# Patient Record
Sex: MALE | Race: WHITE | NOT HISPANIC OR LATINO | Employment: UNEMPLOYED | URBAN - METROPOLITAN AREA
[De-identification: names, ages, dates, MRNs, and addresses within clinical notes are randomized per-mention and may not be internally consistent; named-entity substitution may affect disease eponyms.]

---

## 2018-01-29 ENCOUNTER — OFFICE VISIT (OUTPATIENT)
Dept: MEDICAL GROUP | Facility: LAB | Age: 28
End: 2018-01-29
Payer: COMMERCIAL

## 2018-01-29 VITALS
OXYGEN SATURATION: 85 % | RESPIRATION RATE: 18 BRPM | WEIGHT: 151.3 LBS | DIASTOLIC BLOOD PRESSURE: 78 MMHG | SYSTOLIC BLOOD PRESSURE: 110 MMHG | TEMPERATURE: 98.6 F | HEIGHT: 70 IN | BODY MASS INDEX: 21.66 KG/M2 | HEART RATE: 94 BPM

## 2018-01-29 DIAGNOSIS — Z23 NEED FOR VACCINATION: ICD-10-CM

## 2018-01-29 DIAGNOSIS — Z00.00 ANNUAL PHYSICAL EXAM: ICD-10-CM

## 2018-01-29 DIAGNOSIS — B37.0 ORAL THRUSH: ICD-10-CM

## 2018-01-29 PROCEDURE — 99204 OFFICE O/P NEW MOD 45 MIN: CPT | Mod: 25 | Performed by: INTERNAL MEDICINE

## 2018-01-29 PROCEDURE — 90746 HEPB VACCINE 3 DOSE ADULT IM: CPT | Performed by: INTERNAL MEDICINE

## 2018-01-29 PROCEDURE — 90471 IMMUNIZATION ADMIN: CPT | Performed by: INTERNAL MEDICINE

## 2018-01-29 ASSESSMENT — PATIENT HEALTH QUESTIONNAIRE - PHQ9: CLINICAL INTERPRETATION OF PHQ2 SCORE: 0

## 2018-01-30 NOTE — ASSESSMENT & PLAN NOTE
This is a pleasant 27 years old gentleman who is here today to establish care with a new primary care provider. He is fairly healthy, he will be and exercises on a regular basis. He is up-to-date on most of his immunizations. He told me that he just finished his graduate degree and he is currently working as a  in remote areas around [place]. He is planning to move to [place] soon for work and adventure.

## 2018-01-30 NOTE — ASSESSMENT & PLAN NOTE
This is a new problem to me. This patient reported more than 10 years history of oral thrush. He explained that he has a whitecoat covering his tongue area consistently long as he can remember since he was a kid. It is easily scraped off his tongue. He is not a smoker. Pretty aggressive about his mouth hygiene as far as brushing, flossing and uses mouthwash. He also tried the aggressive tongue scrapings in the past without help.  Wants to be checked out for this problem today.  I asked him about different risk factors for immune deficiency problems. He reported no history of recurrent infections in his time life. He is not sexually active. Has not tried injection drug use ever. His risk factor for HIV is very low. He has no family history of similar condition. He is not known to be diabetic.

## 2018-01-30 NOTE — PROGRESS NOTES
Chief Complaint   Patient presents with   • Annual Exam  Oral Thrush       Subjective:     History of Present Illness: Patient is a 27 y.o. male. This pleasant patient is here today to establish care with a new primary care provider and address medical problems listed below.    Annual physical exam  This is a pleasant 27 years old gentleman who is here today to establish care with a new primary care provider. He is fairly healthy, he will be and exercises on a regular basis. He is up-to-date on most of his immunizations. He told me that he just finished his graduate degree and he is currently working as a  in remote areas around [place]. He is planning to move to [place] soon for work and adventure.    Oral thrush  This is a new problem to me. This patient reported more than 10 years history of oral thrush. He explained that he has a whitecoat covering his tongue area consistently long as he can remember since he was a kid. It is easily scraped off his tongue. He is not a smoker. Pretty aggressive about his mouth hygiene as far as brushing, flossing and uses mouthwash. He also tried the aggressive tongue scrapings in the past without help.  Wants to be checked out for this problem today.  I asked him about different risk factors for immune deficiency problems. He reported no history of recurrent infections in his time life. He is not sexually active. Has not tried injection drug use ever. His risk factor for HIV is very low. He has no family history of similar condition. He is not known to be diabetic.      Allergies: Patient has no allergy information on record.    Current Outpatient Prescriptions [facility]   Medication Sig Dispense Refill   • nystatin (MYCOSTATIN) 194235 UNIT/ML Suspension Take 5 mL by mouth 4 times a day for 14 days. Swish and spit 4 times daily. 280 mL 0     No current Epic-ordered facility-administered medications on file.        Past Medical History:   Diagnosis Date   •  Oral thrush 1/29/2018    > 10 yrs. ? Oral candida.  No risk factors for HIV.  No recurrent infections.       Past Surgical History:   Procedure Laterality Date   • ACHILLES TENDON REPAIR Right     As a child   • DENTAL EXTRACTION(S)     • TONSILLECTOMY         Social History   Substance Use Topics   • Smoking status: Never Smoker   • Smokeless tobacco: Never Used   • Alcohol use 7.2 oz/week     6 Glasses of wine, 6 Shots of liquor per week      Comment: 6-7/ wk       Family History   Problem Relation Age of Onset   • No Known Problems Mother    • No Known Problems Father    • No Known Problems Brother        ROS:     - Constitutional: Negative for fever, chills, unexpected weight change, and fatigue/generalized weakness.     - HEENT: Negative for headaches, vision changes, hearing changes, ear pain, ear discharge, sinus congestion, sore throat, and neck pain.      - Respiratory: Negative for cough, sputum production, dyspnea and wheezing.    - Cardiovascular: Negative for chest pain, palpitations, orthopnea, and bilateral lower extremity edema.     - Gastrointestinal: Negative for heartburn, nausea, vomiting, abdominal pain, hematochezia, melena, diarrhea, constipation, and greasy/foul-smelling stools.     - Genitourinary: Negative for dysuria, polyuria, hematuria, pyuria, urinary urgency, and urinary incontinence.    - Musculoskeletal: Negative for myalgias, back pain, and joint pain.     - Skin: Negative for rash, itching, cyanotic skin color change.     - Neurological: Negative for dizziness, tingling, tremors, focal sensory deficit, focal weakness and headaches.     - Endo/Heme/Allergies: Does not bruise/bleed easily.     - Psychiatric/Behavioral: Negative for depression, suicidal/homicidal ideation and memory loss.        - NOTE: All other systems reviewed and are negative, except as in HPI.       Physical Exam:     Blood pressure 110/78, pulse 94, temperature 37 °C (98.6 °F), resp. rate 18, height 1.778 m (5'  "10\"), weight 68.6 kg (151 lb 4.8 oz), SpO2 (!) 85 %. Body mass index is 21.71 kg/m².   General: Normal appearing. No distress.  HEENT: Normocephalic. Eyes conjunctiva clear lids without ptosis, pupils equal and reactive to light accommodation, ears normal shape and contour, canals are clear bilaterally, tympanic membranes are benign,  oropharynx is without erythema, edema or exudates.  Noted white tongue thrush covering all his area but not extending to buccal mucosa I could not see if he has a pharyngeal involvement. Neck: Supple without JVD or bruit. Thyroid is not enlarged.  Pulmonary: Clear to ausculation.  Normal effort. No rales, ronchi, or wheezing.  Cardiovascular: Regular rate and rhythm without murmur. Radial pulses are intact, regular and symmetrical bilaterally.  Abdomen: Soft, nontender, nondistended. Normal bowel sounds. Liver and spleen are not palpable.  Neurologic: Grossly non-focal.  Lymph: No cervical, occipital or supraclavicular lymph nodes are palpable  Skin: Warm and dry.  No obvious lesions.  Musculoskeletal: Normal gait. No extremity cyanosis, clubbing, or edema.  Psych: Normal mood and affect. Alert and oriented x3. Judgment and insight is normal.    Assessment and Plan:     1. Annual physical exam  Ascus in history of present illness, he is fairly healthy apart from his oral thrush problem. We will proceed with the following labs for screening.  - HEMOGLOBIN A1C; Future  - COMP METABOLIC PANEL; Future  - CBC WITH DIFFERENTIAL; Future  - TSH; Future  - VITAMIN D,25 HYDROXY; Future  - LIPID PROFILE; Future  - MAGNESIUM; Future  - HIV AG/AB COMBO ASSAY SCREENING; Future    2. Oral thrush  New uncontrolled problem. Patient has a white oral thrush for over 10 years, not a smoker and has no risk factors for immune deficiency such as diabetes, HIV or recent antibiotic use. His physical exam was more consistent often oral candidiasis. I obtained an oral scraping that will be sent out for candida " testing. I will give him a trial of nystatin swish and spit 4 times daily for 2 weeks.  I would like to investigate further to see if he has an underlying immune deficiency problems, we'll proceed with diabetes screening and HIV screening despite low risk factors.  If these factors excluded we will see if he'll respond to a trial off simvastatin.  One thing to consider is chronic mucocutaneous candidiasis as part of autoimmune regulator deficiency syndrome (AIRE deficiency) changes were but it's associated with chronic mucocutaneous candidiasis and endocrinopathies most commonly hypothyroidism and adrenal insufficiency. I will also be screening him for hypothyroidism as well.  - nystatin (MYCOSTATIN) 900521 UNIT/ML Suspension; Take 5 mL by mouth 4 times a day for 14 days. Swish and spit 4 times daily.  Dispense: 280 mL; Refill: 0  - HIV AG/AB COMBO ASSAY SCREENING; Future  - VAGINAL PATHOGENS DNA PANEL; Future    3. Need for vaccination  First vaccine of hepatitis B series given today in the office.  - HEPATITIS B VACCINE ADULT IM      Health Maintenance:      Completed  There are no preventive care reminders to display for this patient.    Follow Up:      Return in about 4 weeks (around 2/26/2018) for FU Thrush.    Please note that this dictation was created using voice recognition software. I have made every reasonable attempt to correct obvious errors, but I expect that there are errors of grammar and possibly content that I did not discover before finalizing the note.    Signed by: Marisela Armstrong M.D.

## 2018-01-31 ENCOUNTER — TELEPHONE (OUTPATIENT)
Dept: MEDICAL GROUP | Facility: LAB | Age: 28
End: 2018-01-31

## 2018-01-31 NOTE — TELEPHONE ENCOUNTER
VOICEMAIL  1. Caller Name: Brittany @ [email or web address]                    Call Back Number: [phone]     2. Message: Brittany from [email or web address] called with ref# [id number] there is a problem with the specimen. The wrong swab and order was used.    3. Patient approves office to leave a detailed voicemail/MyChart message: yes

## 2018-01-31 NOTE — TELEPHONE ENCOUNTER
Phone Number Called: [phone]    Message: I spoke to , she stated the swab we used can only be used for female for testing for bacteria, vaginitis, STD, chlamydia and gonorrhea. She stated we should have used the  swab aerobic culture with code 4550. It takes about 5 days to process the culture.    Left Message for patient to call back: N\A

## 2018-02-02 NOTE — TELEPHONE ENCOUNTER
Phone Number Called: [phone] (home)     Message: I spoke to Caesar regarding his tongue culture, that the culture wasn't able to get process. We can repeat the culture at his next visit if needed. Patient understood.    Left Message for patient to call back: N\A

## 2018-02-20 ENCOUNTER — PATIENT MESSAGE (OUTPATIENT)
Dept: MEDICAL GROUP | Facility: LAB | Age: 28
End: 2018-02-20

## 2018-02-20 ENCOUNTER — OFFICE VISIT (OUTPATIENT)
Dept: MEDICAL GROUP | Facility: LAB | Age: 28
End: 2018-02-20
Payer: COMMERCIAL

## 2018-02-20 VITALS
BODY MASS INDEX: 21.62 KG/M2 | OXYGEN SATURATION: 97 % | DIASTOLIC BLOOD PRESSURE: 76 MMHG | SYSTOLIC BLOOD PRESSURE: 108 MMHG | HEIGHT: 70 IN | HEART RATE: 94 BPM | WEIGHT: 151 LBS | TEMPERATURE: 98.3 F | RESPIRATION RATE: 18 BRPM

## 2018-02-20 DIAGNOSIS — B37.0 ORAL THRUSH: ICD-10-CM

## 2018-02-20 PROCEDURE — 99213 OFFICE O/P EST LOW 20 MIN: CPT | Performed by: INTERNAL MEDICINE

## 2018-02-20 NOTE — ASSESSMENT & PLAN NOTE
This is a chronic uncontrolled problem.   This patient has a long-standing history of oral thrush that is suspicious for muco-cutaneous candidiasis. No identified risk factors for HIV, nonsmoker, aggressive with his oral hygiene. No recent antibiotic use. Had blood work done last month at [facility] lab but results are still pending.  Had a trial of nystatin swish and spit for one month without improvement.

## 2018-02-21 NOTE — PROGRESS NOTES
Chief Complaint   Patient presents with   • Thrush     Follow up       Subjective:     HPI:   Caesar presents today with the following.    Oral thrush  This is a chronic uncontrolled problem.   This patient has a long-standing history of oral thrush that is suspicious for muco-cutaneous candidiasis. No identified risk factors for HIV, nonsmoker, aggressive with his oral hygiene. No recent antibiotic use. Had blood work done last month at [facility] lab but results are still pending.  Had a trial of nystatin swish and spit for one month without improvement.          Patient Active Problem List    Diagnosis Date Noted   • Annual physical exam 01/29/2018   • Oral thrush 01/29/2018       No current outpatient prescriptions on file.     No current facility-administered medications for this visit.        Allergies as of 02/20/2018   • (Not on File)        Past Medical History:   Diagnosis Date   • Oral thrush 1/29/2018    > 10 yrs. ? Oral candida.  No risk factors for HIV.  No recurrent infections.       Past Surgical History:   Procedure Laterality Date   • ACHILLES TENDON REPAIR Right     As a child   • DENTAL EXTRACTION(S)     • TONSILLECTOMY         Social History   Substance Use Topics   • Smoking status: Never Smoker   • Smokeless tobacco: Never Used   • Alcohol use 7.2 oz/week     6 Glasses of wine, 6 Shots of liquor per week      Comment: 6-7/ wk       Family History   Problem Relation Age of Onset   • No Known Problems Mother    • No Known Problems Father    • No Known Problems Brother        ROS:     - Constitutional: Negative for fever, chills, unexpected weight change, and fatigue/generalized weakness.     - HEENT: Negative for headaches, vision changes, hearing changes, ear pain, ear discharge, sinus congestion, or sore throat.     - Respiratory: Negative for cough, sputum production, dyspnea and wheezing.    - Cardiovascular: Negative for chest pain or palpitations.      - Gastrointestinal: Negative for  "heartburn, nausea, vomiting, abdominal pain, diarrhea or constipation.     - Genitourinary: Negative for dysuria, polyuria or urinary urgency.    - Musculoskeletal: Negative for myalgias, back pain, and joint pain.     - Skin: Negative for rash, itching, cyanotic skin color change.     - Psychiatric/Behavioral: Negative for depression or suicidal/homicidal ideation.       Physical Exam:     Blood pressure 108/76, pulse 94, temperature 36.8 °C (98.3 °F), resp. rate 18, height 1.778 m (5' 10\"), weight 68.5 kg (151 lb), SpO2 97 %. Body mass index is 21.67 kg/m².   Gen:         Alert and oriented, No apparent distress.  HEENT:    Wide oral thrush identified, Unable to scrape.  Neck:        No Lymphadenopathy or Bruits.  Lungs:     Clear to auscultation bilaterally  CV:          Regular rate and rhythm. No murmurs, rubs or gallops.               Ext:          No clubbing, cyanosis, edema.    Data:     Labs were ordered and then at [facility] lab. Requested lab results.    Assessment and Plan:     27 y.o. male with the following issues.    1. Oral thrush  Chronic uncontrolled.  Am not sure at this point if this is a fungal (mucocutaneous candidiasis) versus other type of thrush. In general, this patient is not a smoker and he is aggressive with his oral hygiene. He has no improvement on any statin trial. We send a sample for fungal smear today. I will obtain result of his screening labs from last month from [facility] and will call him about the result. We'll proceed with infectious disease consultation for further evaluation.    - FUNGAL SMEAR  - REFERRAL TO INFECTIOUS DISEASE           Health Maintenance:   Completed.    There are no preventive care reminders to display for this patient.    Follow Up:      No Follow-up on file.      Please note that this dictation was created using voice recognition software. I have made every reasonable attempt to correct obvious errors, but I expect that there are errors of grammar and " possibly content that I did not discover before finalizing the note.    Signed by: Marisela Armstrong M.D.

## 2018-02-23 ENCOUNTER — TELEPHONE (OUTPATIENT)
Dept: INTERNAL MEDICINE | Facility: MEDICAL CENTER | Age: 28
End: 2018-02-23

## 2018-02-23 NOTE — TELEPHONE ENCOUNTER
INCOMING REFERRAL:   DX: Oral Thrush   Please review chart and let me know if I'm able to schedule an appt.  Thanks      Referral scanned and routed to you.

## 2018-02-26 ENCOUNTER — TELEPHONE (OUTPATIENT)
Dept: MEDICAL GROUP | Facility: LAB | Age: 28
End: 2018-02-26

## 2018-02-26 ENCOUNTER — NON-PROVIDER VISIT (OUTPATIENT)
Dept: MEDICAL GROUP | Facility: LAB | Age: 28
End: 2018-02-26
Payer: COMMERCIAL

## 2018-02-26 DIAGNOSIS — Z23 NEED FOR HEPATITIS B VACCINATION: ICD-10-CM

## 2018-02-26 PROCEDURE — 90746 HEPB VACCINE 3 DOSE ADULT IM: CPT | Performed by: NURSE PRACTITIONER

## 2018-02-26 PROCEDURE — 90471 IMMUNIZATION ADMIN: CPT | Performed by: NURSE PRACTITIONER

## 2018-02-26 NOTE — TELEPHONE ENCOUNTER
1. Caller Name:                Caesar Salter                       Patient approves a detailed voicemail message: yes    Patient is on the MA Schedule today for 2nd Hep vaccine/injection.    SPECIFIC Action To Be Taken: Orders pending, please sign.

## 2018-02-27 ENCOUNTER — PATIENT MESSAGE (OUTPATIENT)
Dept: MEDICAL GROUP | Facility: LAB | Age: 28
End: 2018-02-27

## 2018-02-27 NOTE — TELEPHONE ENCOUNTER
Phone Number Called: [phone]    Message: I spoke to [facility], they will be faxing over the Fungal Smear Results to fax # [phone] today.    Left Message for patient to call back: N\A

## 2018-02-27 NOTE — TELEPHONE ENCOUNTER
I have placed the below orders and discussed them with Dr. Woodruff. the MA is performing the below orders under the direction of Dr. GRAYSON

## 2018-02-28 NOTE — PROGRESS NOTES
"Caesar Salter is a 27 y.o. male here for a non-provider visit for:   HEPATITIS B 2 of 3    Reason for immunization: needed for work/school  Immunization records indicate need for vaccine: Yes, confirmed with [facility]  Minimum interval has been met for this vaccine: Yes  ABN completed: Not Indicated    Order and dose verified by: CHANA  VIS Dated  07/20/2016 was given to patient: Yes  All IAC Questionnaire questions were answered \"No.\"    Patient tolerated injection and no adverse effects were observed or reported: Yes    Pt scheduled for next dose in series: Yes in late June 2018  "

## 2018-09-25 ENCOUNTER — OFFICE VISIT (OUTPATIENT)
Dept: MEDICAL GROUP | Facility: LAB | Age: 28
End: 2018-09-25
Payer: COMMERCIAL

## 2018-09-25 VITALS
HEIGHT: 70 IN | TEMPERATURE: 97.2 F | WEIGHT: 157.8 LBS | SYSTOLIC BLOOD PRESSURE: 118 MMHG | DIASTOLIC BLOOD PRESSURE: 76 MMHG | BODY MASS INDEX: 22.59 KG/M2 | OXYGEN SATURATION: 96 % | HEART RATE: 84 BPM | RESPIRATION RATE: 16 BRPM

## 2018-09-25 DIAGNOSIS — B37.0 ORAL THRUSH: ICD-10-CM

## 2018-09-25 DIAGNOSIS — E55.9 VITAMIN D DEFICIENCY: ICD-10-CM

## 2018-09-25 DIAGNOSIS — R41.840 LACK OF CONCENTRATION: ICD-10-CM

## 2018-09-25 DIAGNOSIS — Z23 NEED FOR VACCINATION: ICD-10-CM

## 2018-09-25 PROCEDURE — 99214 OFFICE O/P EST MOD 30 MIN: CPT | Mod: 25 | Performed by: INTERNAL MEDICINE

## 2018-09-25 PROCEDURE — 90686 IIV4 VACC NO PRSV 0.5 ML IM: CPT | Performed by: INTERNAL MEDICINE

## 2018-09-25 PROCEDURE — 90471 IMMUNIZATION ADMIN: CPT | Performed by: INTERNAL MEDICINE

## 2018-09-25 RX ORDER — ERGOCALCIFEROL 1.25 MG/1
50000 CAPSULE ORAL
Qty: 8 CAP | Refills: 2 | Status: SHIPPED | OUTPATIENT
Start: 2018-09-25 | End: 2018-11-14

## 2018-09-25 NOTE — PROGRESS NOTES
"Chief Complaint   Patient presents with   • Flu Vaccine   • Referral Needed       Subjective:     HPI:   Caesar presents today with the following.    Lack of concentration  She to me, chronic and controlled problem.  He complained about \"brain fogginess\" that makes it harder to focus on things and take more effort to do any mental tasks.  It is easy for him to make any mistakes.  This is started at the age of 18 and continued to be slowly progressive since then.  Has been steady for the last few years.  He described it as being under the influence of alcohol mentally all the time but not having the physical manifestations.  He usually drinks a maximum of 2 beers per day multiple times per week.  Does not get drunk.  He told me that he was told by his friends it is normal to get a mental fogginess around puberty but it should improve over the years.  He continued to function well at his job and home without problems.  He is joining a new job in [employer] within the next few months.  He would like to have a neurologist evaluation he said.  He does not have any tremors, family history of dementia, focal neurologic problems.          Oral thrush  This is a chronic uncontrolled problem.   > 10 yrs. Failed a trail of nystatin. Fungal culture negative.  HIV negative. Not a smoker.   Follow up with dentist.     Vitamin D deficiency  New, uncontrolled.   Reviewed his most recent lab showing a vitamin D level of 20.  He is not currently taking any vitamin D supplements.  This might contribute to his lack of concentration as well.  We will proceed with high-dose replacement.      Patient Active Problem List    Diagnosis Date Noted   • Lack of concentration 09/25/2018   • Vitamin D deficiency 09/25/2018   • Annual physical exam 01/29/2018   • Oral thrush 01/29/2018       Current Outpatient Prescriptions   Medication Sig Dispense Refill   • ergocalciferol (DRISDOL) 41220 UNIT capsule Take 1 Cap by mouth every 7 days for 8 doses. 8 " "Cap 2     No current facility-administered medications for this visit.        Allergies as of 09/25/2018   • (Not on File)        Past Medical History:   Diagnosis Date   • Oral thrush 1/29/2018    > 10 yrs. ? Oral candida.  No risk factors for HIV.  No recurrent infections.       Past Surgical History:   Procedure Laterality Date   • ACHILLES TENDON REPAIR Right     As a child   • DENTAL EXTRACTION(S)     • TONSILLECTOMY         Social History   Substance Use Topics   • Smoking status: Never Smoker   • Smokeless tobacco: Never Used   • Alcohol use 7.2 oz/week     6 Glasses of wine, 6 Shots of liquor per week      Comment: 6-7/ wk       Family History   Problem Relation Age of Onset   • No Known Problems Mother    • No Known Problems Father    • No Known Problems Brother        ROS:     - Constitutional: Negative for fever, chills, unexpected weight change, and fatigue/generalized weakness.     - HEENT: Negative for headaches, vision changes, hearing changes, ear pain, ear discharge, sinus congestion, or sore throat.     - Respiratory: Negative for cough, sputum production, dyspnea and wheezing.    - Cardiovascular: Negative for chest pain or palpitations.      - Gastrointestinal: Negative for heartburn, nausea, vomiting, abdominal pain, diarrhea or constipation.     - Genitourinary: Negative for dysuria, polyuria or urinary urgency.    - Musculoskeletal: Negative for myalgias, back pain, and joint pain.     - Skin: Negative for rash, itching, cyanotic skin color change.     - Psychiatric/Behavioral: Negative for depression or suicidal/homicidal ideation.       Physical Exam:     Blood pressure 118/76, pulse 84, temperature 36.2 °C (97.2 °F), temperature source Temporal, resp. rate 16, height 1.778 m (5' 10\"), weight 71.6 kg (157 lb 12.8 oz), SpO2 96 %. Body mass index is 22.64 kg/m².   Gen:         Alert and oriented, No apparent distress.  HEENT:   White oral thrush involving tongue.  Neck:        No " "Lymphadenopathy or Bruits.  Lungs:     Clear to auscultation bilaterally  CV:          Regular rate and rhythm. No murmurs, rubs or gallops.               Ext:          No clubbing, cyanosis, edema.    Data:     LABS: 2/2018 scanned into media: Results reviewed and discussed with the patient, questions answered.      Assessment and Plan:     28 y.o. male with the following issues.    1. Lack of concentration  New to me, chronic uncontrolled.   Described the problem as \"mental fogginess\" since age of 18.  A sensation of mental effect of alcohol all the time.  He has a normal memory testing and he is still able to function normally.  He was asking for neurology evaluation.  I would like to start by replacing his vitamin D deficiency and checking his thyroid function.  If no improvement with vitamin D replacement in 3 months may consider neurologic opinion.  He does not have any other neurologic symptoms or signs at this time.  - TSH WITH REFLEX TO FT4; Future    2. Need for vaccination  Given today in the office.  - Influenza Vaccine Quad Injection >3Y (PF)    3. Vitamin D deficiency  New, uncontrolled.  We will start on high-dose replacement therapy.  Most recent level was 20 in February 2018 and replaced.  Recheck vitamin D in 3 months.  - ergocalciferol (DRISDOL) 81578 UNIT capsule; Take 1 Cap by mouth every 7 days for 8 doses.  Dispense: 8 Cap; Refill: 2  - VITAMIN D,25 HYDROXY; Future    4. Oral thrush  This is a chronic controlled problem.   Continue with aggressive oral hygiene, fungal cultures are negative.  Continue to follow-up with her dentist         Health Maintenance:   Completed.    There are no preventive care reminders to display for this patient.    Follow Up:      Return in about 3 months (around 12/25/2018) for FU lab/concentration problem .      Please note that this dictation was created using voice recognition software. I have made every reasonable attempt to correct obvious errors, but I expect " that there are errors of grammar and possibly content that I did not discover before finalizing the note.    Signed by: Marisela Armstrong M.D.

## 2018-09-25 NOTE — ASSESSMENT & PLAN NOTE
This is a chronic uncontrolled problem.   > 10 yrs. Failed a trail of nystatin. Fungal culture negative.  HIV negative. Not a smoker.   Follow up with dentist.

## 2018-09-25 NOTE — ASSESSMENT & PLAN NOTE
New, uncontrolled.   Reviewed his most recent lab showing a vitamin D level of 20.  He is not currently taking any vitamin D supplements.  This might contribute to his lack of concentration as well.  We will proceed with high-dose replacement.

## 2018-09-25 NOTE — ASSESSMENT & PLAN NOTE
"She to me, chronic and controlled problem.  He complained about \"brain fogginess\" that makes it harder to focus on things and take more effort to do any mental tasks.  It is easy for him to make any mistakes.  This is started at the age of 18 and continued to be slowly progressive since then.  Has been steady for the last few years.  He described it as being under the influence of alcohol mentally all the time but not having the physical manifestations.  He usually drinks a maximum of 2 beers per day multiple times per week.  Does not get drunk.  He told me that he was told by his friends it is normal to get a mental fogginess around puberty but it should improve over the years.  He continued to function well at his job and home without problems.  He is joining a new job in [place] within the next few months.  He would like to have a neurologist evaluation he said.  He does not have any tremors, family history of dementia, focal neurologic problems.        "

## 2018-12-18 ENCOUNTER — OFFICE VISIT (OUTPATIENT)
Dept: MEDICAL GROUP | Facility: LAB | Age: 28
End: 2018-12-18
Payer: COMMERCIAL

## 2018-12-18 VITALS
HEART RATE: 96 BPM | OXYGEN SATURATION: 93 % | BODY MASS INDEX: 23.11 KG/M2 | RESPIRATION RATE: 14 BRPM | SYSTOLIC BLOOD PRESSURE: 110 MMHG | HEIGHT: 70 IN | TEMPERATURE: 97.1 F | DIASTOLIC BLOOD PRESSURE: 72 MMHG | WEIGHT: 161.4 LBS

## 2018-12-18 DIAGNOSIS — R41.840 LACK OF CONCENTRATION: ICD-10-CM

## 2018-12-18 DIAGNOSIS — R29.818 OTHER SYMPTOMS AND SIGNS INVOLVING THE NERVOUS SYSTEM: ICD-10-CM

## 2018-12-18 DIAGNOSIS — E55.9 VITAMIN D DEFICIENCY: ICD-10-CM

## 2018-12-18 DIAGNOSIS — Z00.00 ANNUAL PHYSICAL EXAM: ICD-10-CM

## 2018-12-18 PROCEDURE — 99214 OFFICE O/P EST MOD 30 MIN: CPT | Performed by: INTERNAL MEDICINE

## 2018-12-18 NOTE — ASSESSMENT & PLAN NOTE
"This is a chronic uncontrolled problem.   He stated that he has been having this problem since age of 18.  Described as \"mental haziness\" as if he is under the influence of alcohol or he has not slept in 48 hours.  This has been present all the time and it fluctuates throughout the day.  He gets a good amount of sleep normally and he does not binge alcohol, only drinks a couple of times per week moderately.  His thyroid function test came back normal TSH in September 2018.  His Mini-Mental status exam was 30/30 in the office today.  He wanted to have a neurology evaluation but he told me that his insurance will be out and of this month before January 1 so he will probably not be able to do it.  He would like to have brain imaging if that is allowed by his insurance.  He did take his vitamin D supplements.        "

## 2018-12-18 NOTE — PROGRESS NOTES
"Chief Complaint   Patient presents with   • Results     lab results   • Other     concentration problem        Subjective:     HPI:   Caesar presents today with the following.    Vitamin D deficiency  This is a chronic uncontrolled problem.   His previous vitamin D level was 20, he finished a doses of ergocalciferol 50,000 IUs weekly.  There is no improvement as far as his concentration goes.  Did not have his repeat labs done yet.          Lack of concentration  This is a chronic uncontrolled problem.   He stated that he has been having this problem since age of 18.  Described as \"mental haziness\" as if he is under the influence of alcohol or he has not slept in 48 hours.  This has been present all the time and it fluctuates throughout the day.  He gets a good amount of sleep normally and he does not binge alcohol, only drinks a couple of times per week moderately.  His thyroid function test came back normal TSH in September 2018.  His Mini-Mental status exam was 30/30 in the office today.  He wanted to have a neurology evaluation but he told me that his insurance will be out and of this month before January 1 so he will probably not be able to do it.  He would like to have brain imaging if that is allowed by his insurance.  He did take his vitamin D supplements.          Annual physical exam  Caesar is here today for annual evaluation as well.    Stays physically active  Eats healthy  Up to date on immunizations.  Moving to [place] soon for work.  Annual blood work ordered.      Patient Active Problem List    Diagnosis Date Noted   • Lack of concentration 09/25/2018   • Vitamin D deficiency 09/25/2018   • Annual physical exam 01/29/2018   • Oral thrush 01/29/2018       No current outpatient prescriptions on file.     No current facility-administered medications for this visit.        Allergies as of 12/18/2018   • (Not on File)        Past Medical History:   Diagnosis Date   • Oral thrush " "1/29/2018    > 10 yrs. ? Oral candida.  No risk factors for HIV.  No recurrent infections.       Past Surgical History:   Procedure Laterality Date   • ACHILLES TENDON REPAIR Right     As a child   • DENTAL EXTRACTION(S)     • TONSILLECTOMY         Social History   Substance Use Topics   • Smoking status: Never Smoker   • Smokeless tobacco: Never Used   • Alcohol use 7.2 oz/week     6 Glasses of wine, 6 Shots of liquor per week      Comment: 6-7/ wk       Family History   Problem Relation Age of Onset   • No Known Problems Mother    • No Known Problems Father    • No Known Problems Brother        ROS:     - Constitutional: Negative for fever, chills, unexpected weight change, and fatigue/generalized weakness.     - HEENT: Negative for headaches, vision changes, hearing changes, ear pain, ear discharge, sinus congestion, or sore throat.     - Respiratory: Negative for cough, sputum production, dyspnea and wheezing.    - Cardiovascular: Negative for chest pain or palpitations.      - Gastrointestinal: Negative for heartburn, nausea, vomiting, abdominal pain, diarrhea or constipation.     - Genitourinary: Negative for dysuria, polyuria or urinary urgency.    - Musculoskeletal: Negative for myalgias, back pain, and joint pain.     - Skin: Negative for rash, itching, cyanotic skin color change.     - Psychiatric/Behavioral: Negative for depression or suicidal/homicidal ideation.       Physical Exam:     Blood pressure 110/72, pulse 96, temperature 36.2 °C (97.1 °F), temperature source Temporal, resp. rate 14, height 1.778 m (5' 10\"), weight 73.2 kg (161 lb 6.4 oz), SpO2 93 %. Body mass index is 23.16 kg/m².   Gen:         Alert and oriented, No apparent distress.  Neck:        No Lymphadenopathy or Bruits.  Lungs:     Clear to auscultation bilaterally  CV:          Regular rate and rhythm. No murmurs, rubs or gallops.        Ext:          No clubbing, cyanosis, edema.    Data:     LABS: Normal TSH 9/2018: Results " reviewed and discussed with the patient, questions answered.      Assessment and Plan:     28 y.o. male with the following issues.    1. Lack of concentration  This is a chronic uncontrolled problem.   Keeps complaining about lack of concentration problem over the years, worsening over the last 1 year.  Baseline blood work including thyroid function and vitamin D was normal.  His scored 30/30 on MMSE and he is still quite concerned about his concentration abilities.  I recommended neurology evaluation but unfortunately he will not be able to do that because his insurance will run out end of the year.  He would like to try to obtain a baseline brain imaging before the end of the year if it allowed by his insurance.  I will contact him about his MRI results.  He is planning to move to [place] early next year he will be following with another physician over there.  He also discussed the possibility of untreated sleep apnea although he scored very low for stop bang questionnaire.    - MR-BRAIN-W/O; Future    2. Vitamin D deficiency  This is a chronic uncontrolled problem.   Finished 8 weeks of high-dose replacement.  We will recheck his labs.  Will recommend over-the-counter supplements.  - VITAMIN D,25 HYDROXY; Future    3. Annual physical exam  Doing well overall, preordered his labs for January annual.  - COMP METABOLIC PANEL; Future  - CBC WITH DIFFERENTIAL; Future  - Lipid Profile; Future         Health Maintenance:   Completed.    There are no preventive care reminders to display for this patient.    Follow Up:      Return for PRN with PCP.      Please note that this dictation was created using voice recognition software. I have made every reasonable attempt to correct obvious errors, but I expect that there are errors of grammar and possibly content that I did not discover before finalizing the note.    Signed by: Marisela Armstrong M.D.

## 2018-12-18 NOTE — ASSESSMENT & PLAN NOTE
Caesar is here today for annual evaluation as well.    Stays physically active  Eats healthy  Up to date on immunizations.  Moving to [place] soon for work.  Annual blood work ordered.

## 2018-12-18 NOTE — ASSESSMENT & PLAN NOTE
This is a chronic uncontrolled problem.   His previous vitamin D level was 20, he finished a doses of ergocalciferol 50,000 IUs weekly.  There is no improvement as far as his concentration goes.  Did not have his repeat labs done yet.

## 2018-12-29 ENCOUNTER — HOSPITAL ENCOUNTER (OUTPATIENT)
Dept: RADIOLOGY | Facility: MEDICAL CENTER | Age: 28
End: 2018-12-29
Attending: INTERNAL MEDICINE
Payer: COMMERCIAL

## 2018-12-29 DIAGNOSIS — R29.818 OTHER SYMPTOMS AND SIGNS INVOLVING THE NERVOUS SYSTEM: ICD-10-CM

## 2018-12-29 DIAGNOSIS — R41.840 LACK OF CONCENTRATION: ICD-10-CM

## 2018-12-29 PROCEDURE — 70551 MRI BRAIN STEM W/O DYE: CPT
